# Patient Record
Sex: FEMALE | Race: WHITE
[De-identification: names, ages, dates, MRNs, and addresses within clinical notes are randomized per-mention and may not be internally consistent; named-entity substitution may affect disease eponyms.]

---

## 2020-03-09 ENCOUNTER — HOSPITAL ENCOUNTER (OUTPATIENT)
Dept: HOSPITAL 35 - SJCVCIMAG | Age: 55
End: 2020-03-09
Attending: INTERNAL MEDICINE
Payer: COMMERCIAL

## 2020-03-09 DIAGNOSIS — K55.1: ICD-10-CM

## 2020-03-09 DIAGNOSIS — I70.90: ICD-10-CM

## 2020-03-09 DIAGNOSIS — I65.23: Primary | ICD-10-CM

## 2020-03-09 DIAGNOSIS — J44.9: ICD-10-CM

## 2020-03-09 DIAGNOSIS — F17.200: ICD-10-CM

## 2020-03-09 DIAGNOSIS — I71.4: ICD-10-CM

## 2020-03-09 DIAGNOSIS — I10: ICD-10-CM

## 2020-03-17 ENCOUNTER — HOSPITAL ENCOUNTER (OUTPATIENT)
Dept: HOSPITAL 35 - CATH | Age: 55
Discharge: HOME | End: 2020-03-17
Attending: INTERNAL MEDICINE
Payer: COMMERCIAL

## 2020-03-17 VITALS — HEIGHT: 59 IN | BODY MASS INDEX: 18.58 KG/M2 | WEIGHT: 92.15 LBS

## 2020-03-17 VITALS — SYSTOLIC BLOOD PRESSURE: 157 MMHG | DIASTOLIC BLOOD PRESSURE: 93 MMHG

## 2020-03-17 DIAGNOSIS — Z82.49: ICD-10-CM

## 2020-03-17 DIAGNOSIS — E78.00: ICD-10-CM

## 2020-03-17 DIAGNOSIS — I70.0: ICD-10-CM

## 2020-03-17 DIAGNOSIS — Z98.51: ICD-10-CM

## 2020-03-17 DIAGNOSIS — Z79.82: ICD-10-CM

## 2020-03-17 DIAGNOSIS — I73.9: ICD-10-CM

## 2020-03-17 DIAGNOSIS — E78.5: ICD-10-CM

## 2020-03-17 DIAGNOSIS — I25.10: ICD-10-CM

## 2020-03-17 DIAGNOSIS — I10: ICD-10-CM

## 2020-03-17 DIAGNOSIS — Z79.899: ICD-10-CM

## 2020-03-17 DIAGNOSIS — Z98.890: ICD-10-CM

## 2020-03-17 DIAGNOSIS — R07.9: Primary | ICD-10-CM

## 2020-03-17 DIAGNOSIS — F17.210: ICD-10-CM

## 2020-03-17 DIAGNOSIS — J44.9: ICD-10-CM

## 2020-03-17 LAB
ANION GAP SERPL CALC-SCNC: 9 MMOL/L (ref 7–16)
BUN SERPL-MCNC: 8 MG/DL (ref 7–18)
CALCIUM SERPL-MCNC: 9.5 MG/DL (ref 8.5–10.1)
CHLORIDE SERPL-SCNC: 101 MMOL/L (ref 98–107)
CO2 SERPL-SCNC: 29 MMOL/L (ref 21–32)
CREAT SERPL-MCNC: 0.9 MG/DL (ref 0.6–1)
ERYTHROCYTE [DISTWIDTH] IN BLOOD BY AUTOMATED COUNT: 13.1 % (ref 10.5–14.5)
GLUCOSE SERPL-MCNC: 102 MG/DL (ref 74–106)
HCT VFR BLD CALC: 42.1 % (ref 37–47)
HGB BLD-MCNC: 14.2 GM/DL (ref 12–15)
MCH RBC QN AUTO: 30.5 PG (ref 26–34)
MCHC RBC AUTO-ENTMCNC: 33.7 G/DL (ref 28–37)
MCV RBC: 90.5 FL (ref 80–100)
PLATELET # BLD: 361 THOU/UL (ref 150–400)
POTASSIUM SERPL-SCNC: 4.6 MMOL/L (ref 3.5–5.1)
RBC # BLD AUTO: 4.65 MIL/UL (ref 4.2–5)
SODIUM SERPL-SCNC: 139 MMOL/L (ref 136–145)
WBC # BLD AUTO: 7.9 THOU/UL (ref 4–11)

## 2020-03-17 NOTE — EKG
[facility]
[address]
[place]   [address]                     ELECTROCARDIOGRAM REPORT      
_______________________________________________________________________________
 
Name:       KRISHNA DAVILA            Room #:                     [facility]#:      [id number]                       Account #:      [id number]  
Admission:  20    Attend Phys:    Cole Garcia MD,
Discharge:              Date of Birth:  [birth year]  
                                                          Report #: [id number]
                                                                    [id number]
_______________________________________________________________________________
THIS REPORT FOR:  
 
cc:  BASILIA - Yaritza family physician/PCP 
     FAM - No family physician/PCP 
     Kimo Arguelles MD [facility]                                        ~
THIS REPORT FOR:   //name//                          
 
                          [facility]
                                       
Test Date:    2020               Test Time:    07:58:57
Pat Name:     KRISHNA DAVILA         Department:   
Patient ID:   [id number]            Room:          
Gender:                               Technician:   BRAYAN CARDOZA
:          1965               Requested By: Cole Gacria
Order Number: [id number] MD:   Kimo Arguelles
                                 Measurements
Intervals                              Axis          
Rate:         68                       P:            87
MS:           147                      QRS:          78
QRSD:         83                       T:            75
QT:           393                                    
QTc:          418                                    
                           Interpretive Statements
Sinus rhythm
Normal tracing
No previous ECG available for comparison
 
Electronically Signed On 3- 8:56:15 CDT by Kimo Arguelles
[email or web address]
 
 
 
 
 
 
 
 
 
 
 
 
 
 
 
  <ELECTRONICALLY SIGNED>
   By: Kimo Arguelles MD, FAC   
  20     0856
D: 20 0758                           _____________________________________
T: 20 0758                           Kimo Arguelles MD, [facility]     /EPI

## 2020-03-17 NOTE — CATHLAB
[facility]
[address]
[place]   [address]                   INVASIVE PROCEDURE REPORT     
_______________________________________________________________________________
 
Name:       KRISHNA DAVILA            [id number]                       Account #:      [id number]  
Admission:  03/17/20    Attend Phys:    Cole Garcia MD,
Discharge:              Date of Birth:  [birth year]  
                                                          Report #: [id number]
                                                                    [id number]
_______________________________________________________________________________
THIS REPORT FOR:  
 
cc:  FAM - No family physician/PCP 
     FAM - No family physician/PCP 
     Cole Garcia MD [facility]                                        
                                                                       ~
 
--------------- APPROVED REPORT --------------
 
 
Study performed:  03/17/2020 09:09:09
 
Patient Details
The patient is a 54 year-old female
 
Event Personnel
Cole Garcia  Interventional Cardiologist, Bella Moody RN RN, 
Monica Dupont RTR, Joon Márquez Roberta  Monitor
 
Procedures Performed
Art Access - R femoral artery*  Left Heart Cath w/or w/o Coronaries 
[id number] [facility] Renal Bilateral Peripheral Angiography [id number] CVRENALBIL 
Aortogram Abdominal Peripheral Angio [id number] Hemostasis w/ Mynx  26963 
Initial Mod Sed Same Phys/QHP Gr5y [id number] 41666 Mod Sed Same Phys/QHP 
Ea [id number]
 
Indication
Chest pain
 
Procedure Narrative
The Right Groin^ was infiltrated with 1% Lidocaine subcutaneous 
anesthesia. A PINNACLE 6FR Sheath #[id number] sheath was inserted into 
the RFA 6F^. Coronary angiography was performed using coronary 
diagnostic catheters. The right coronary system was accessed and 
visualized with a 3DRC catheter. The left coronary system was 
accessed and visualized with a JL4 catheter. The left ventricle was 
accessed and visualized with a ANGLE PIG catheter. Left 
ventriculogram was performed in 30 degree projection. An aortogram of 
the abdominal aorta was performed. The patient tolerated the 
procedure well and there were no complications associated with the 
procedure. There was no hematoma.
 
Intraoperative Conscious Sedation
Sedation start time:  0920           Case end Time:  
0950    
Fentanyl  50 mcg    Versed  1 mg  
 
 
[facility]
[place]  [address]
Phone:  [phone]                    INVASIVE PROCEDURE REPORT     
_______________________________________________________________________________
 
Name:            KRISHNA DAVILA            Room #:                    [facility] [address]#:           [id number]          Account #:     [id number]  
Admission:       03/17/20         Attend Phys:   Cole Garcia,
Discharge:                  Date of Birth: [birth year]  
                         Report #:      [id number]
        [id number]
_______________________________________________________________________________
 
Fluoro Time:    3.11 minutes    
Dose:     .00 cGycm2  93 mGy  
Contrast Type and Amount:  95     
 
Hemodynamics
The aortic pressure is 130/75 mmHg with a mean of 96 mmHg. The left 
ventricular pressure is 148/5 mmHg with a mean of mmHg. The left 
ventricular end diastolic pressure is 18 mmHg. 
 
Conclusion
#1.  Normal left jugular size and systolic function EF 60%
#2 abdominal aorta is mildly plaqued there is distal aortic narrowing 
at the iliac bifurcation in the 50% range.
#3 left main free of disease giving rise to LAD and circumflex
#4 LAD is an eccentric proximal lesion of 30 to 40% off the left main 
and diffuse distal disease no occlusive disease
#5 circumflex OM is a dominant system.  There is diffuse distal 
disease and attenuated distal vessels but no occlusive disease
#6 small nondominant right coronary artery no disease
#7 bilateral renal arteries are single and mild ostial irregularity 
but no occlusive disease
 
Recommendations and plan continue aggressive risk factor 
modification.  Smoking cessation strongly recommended.  Patient will 
be recovered and follow discharge protocol.  Follow-up scheduled.
 
 
 
 
 
 
 
 
 
 
 
 
 
 
 
 
 
 
  <ELECTRONICALLY SIGNED>
   By: Cole Garcia MD, [facility]   
  03/17/20     1122
D: 03/17/20 1122                           _____________________________________
T: 03/17/20 1122                           Cole Garcia MD, FAC     /INF